# Patient Record
Sex: FEMALE | Race: WHITE | NOT HISPANIC OR LATINO | Employment: STUDENT | URBAN - NONMETROPOLITAN AREA
[De-identification: names, ages, dates, MRNs, and addresses within clinical notes are randomized per-mention and may not be internally consistent; named-entity substitution may affect disease eponyms.]

---
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
## 2024-01-05 ENCOUNTER — TRANSCRIBE ORDERS (OUTPATIENT)
Dept: ADMINISTRATIVE | Facility: HOSPITAL | Age: 19
End: 2024-01-05
Payer: COMMERCIAL

## 2024-01-05 ENCOUNTER — HOSPITAL ENCOUNTER (OUTPATIENT)
Facility: HOSPITAL | Age: 19
Discharge: HOME | End: 2024-01-05
Admitting: NURSE PRACTITIONER
Payer: COMMERCIAL

## 2024-01-05 DIAGNOSIS — R10.9 RIGHT SIDED ABDOMINAL PAIN: Primary | ICD-10-CM

## 2024-01-05 DIAGNOSIS — R10.9 RIGHT SIDED ABDOMINAL PAIN: ICD-10-CM

## 2024-01-05 PROCEDURE — 76705 ECHO EXAM OF ABDOMEN: CPT

## 2024-01-11 ENCOUNTER — OFFICE VISIT (OUTPATIENT)
Dept: SURGERY | Facility: CLINIC | Age: 19
End: 2024-01-11
Payer: COMMERCIAL

## 2024-01-11 VITALS
BODY MASS INDEX: 31.18 KG/M2 | SYSTOLIC BLOOD PRESSURE: 106 MMHG | HEIGHT: 66 IN | DIASTOLIC BLOOD PRESSURE: 70 MMHG | WEIGHT: 194 LBS

## 2024-01-11 DIAGNOSIS — K80.20 GALLSTONES: Primary | ICD-10-CM

## 2024-01-11 PROCEDURE — 99203 OFFICE O/P NEW LOW 30 MIN: CPT | Performed by: SURGERY

## 2024-01-11 NOTE — PROGRESS NOTES
Subjective   Nikkie Buckley is a 18 y.o. female.     Chief Complaint: gallstones    History of Present Illness She is a 19 yo who had a attack of upper abdominal pain and nausea. US showed gallstones. LFTs were ok.. she hs had some constipation but this does ok with a stool softener.    The following portions of the patient's history were reviewed and updated as appropriate: current medications, past family history, past medical history, past social history, past surgical history and problem list.    Review of Systems   Constitutional:  Negative for activity change, appetite change, chills, fever and unexpected weight change.   HENT:  Negative for congestion, facial swelling and sore throat.    Eyes:  Negative for photophobia and visual disturbance.   Respiratory:  Negative for chest tightness, shortness of breath and wheezing.    Cardiovascular:  Negative for chest pain, palpitations and leg swelling.   Gastrointestinal:  Positive for abdominal pain, constipation and nausea. Negative for abdominal distention, anal bleeding, blood in stool, diarrhea, rectal pain and vomiting.   Endocrine: Negative for cold intolerance, heat intolerance, polydipsia and polyuria.   Genitourinary:  Negative for difficulty urinating, dysuria, flank pain and urgency.   Musculoskeletal:  Negative for back pain and myalgias.   Skin:  Negative for rash and wound.   Allergic/Immunologic: Negative for immunocompromised state.   Neurological:  Negative for dizziness, seizures, syncope, light-headedness, numbness and headaches.   Hematological:  Negative for adenopathy. Does not bruise/bleed easily.   Psychiatric/Behavioral:  Negative for behavioral problems and confusion. The patient is not nervous/anxious.        Objective   Physical Exam  Vitals reviewed.   Constitutional:       General: She is not in acute distress.     Appearance: She is well-developed. She is not ill-appearing.   HENT:      Head: Normocephalic. No laceration. Hair is normal.       Right Ear: Hearing and ear canal normal.      Left Ear: Hearing and ear canal normal.      Nose: Nose normal.      Right Sinus: No maxillary sinus tenderness or frontal sinus tenderness.      Left Sinus: No maxillary sinus tenderness or frontal sinus tenderness.   Eyes:      General: Lids are normal.      Conjunctiva/sclera: Conjunctivae normal.      Pupils: Pupils are equal, round, and reactive to light.   Neck:      Thyroid: No thyroid mass or thyromegaly.      Vascular: No JVD.      Trachea: No tracheal tenderness or tracheal deviation.   Cardiovascular:      Rate and Rhythm: Normal rate and regular rhythm.      Heart sounds: No murmur heard.     No gallop.   Pulmonary:      Effort: Pulmonary effort is normal.      Breath sounds: Normal breath sounds. No stridor. No wheezing.   Chest:      Chest wall: No tenderness.   Abdominal:      General: Bowel sounds are normal. There is no distension.      Palpations: Abdomen is soft. There is no mass.      Tenderness: There is no abdominal tenderness. There is no guarding or rebound.      Hernia: No hernia is present.   Musculoskeletal:         General: No deformity.      Cervical back: Normal range of motion.   Lymphadenopathy:      Cervical: No cervical adenopathy.      Upper Body:      Right upper body: No supraclavicular adenopathy.      Left upper body: No supraclavicular adenopathy.   Skin:     General: Skin is warm and dry.      Coloration: Skin is not pale.      Findings: No erythema or rash.   Neurological:      Mental Status: She is alert and oriented to person, place, and time.      Motor: No abnormal muscle tone.   Psychiatric:         Behavior: Behavior normal.         Thought Content: Thought content normal.       No past medical history on file.    No family history on file.         No past surgical history on file.    No current outpatient medications      Assessment & Plan   Diagnoses and all orders for this visit:    1. Gallstones (Primary)    She is  wanting to wait until her summer break from school before getting a lap meme.

## 2024-04-08 ENCOUNTER — TELEPHONE (OUTPATIENT)
Dept: SURGERY | Facility: CLINIC | Age: 19
End: 2024-04-08

## 2024-04-23 DIAGNOSIS — K80.20 GALLSTONES: Primary | ICD-10-CM

## 2024-12-16 ENCOUNTER — OFFICE VISIT (OUTPATIENT)
Dept: ENDOCRINOLOGY | Facility: CLINIC | Age: 19
End: 2024-12-16
Payer: COMMERCIAL

## 2024-12-16 VITALS
WEIGHT: 208 LBS | OXYGEN SATURATION: 99 % | DIASTOLIC BLOOD PRESSURE: 76 MMHG | HEART RATE: 73 BPM | SYSTOLIC BLOOD PRESSURE: 124 MMHG | HEIGHT: 66 IN | BODY MASS INDEX: 33.43 KG/M2

## 2024-12-16 DIAGNOSIS — E06.3 HASHIMOTO'S THYROIDITIS: ICD-10-CM

## 2024-12-16 DIAGNOSIS — R79.89 HIGH SERUM DEHYDROEPIANDROSTERONE (DHEA): Primary | ICD-10-CM

## 2024-12-16 PROCEDURE — 99204 OFFICE O/P NEW MOD 45 MIN: CPT | Performed by: INTERNAL MEDICINE

## 2024-12-16 NOTE — PROGRESS NOTES
Nikkie Marcio 19 y.o.  CC: new patient referred for elevation in thyroid antibodies along with high dhea-s    [place]: new patient referred for elevation in thyroid antibodies, high dhea-s    Has been diagnosed with PCOS  DHEA-s 800 with testosterone 89  Discussed ddx   She has regular menses  Has had coarse hair growth over chin and upper lip for last 2-3 years  Some weight gain, striae  Central weight retention  BP is good   No low potassium   No depression or anxiety   No headaches or vision changes      No Known Allergies  No current outpatient medications on file.  Patient Active Problem List    Diagnosis     High serum dehydroepiandrosterone (DHEA) [R79.89]     Hashimoto's thyroiditis [E06.3]     Elevated testosterone level [R79.89]     Gallstones [K80.20]      Review of Systems   Constitutional:  Negative for activity change, appetite change and unexpected weight change.   HENT:  Negative for congestion and rhinorrhea.    Eyes:  Negative for visual disturbance.   Respiratory:  Negative for cough and shortness of breath.    Cardiovascular:  Negative for palpitations and leg swelling.   Gastrointestinal:  Negative for constipation, diarrhea and nausea.   Genitourinary:  Negative for hematuria.   Musculoskeletal:  Negative for arthralgias, back pain, gait problem, joint swelling and myalgias.   Skin:  Negative for color change, rash and wound.   Allergic/Immunologic: Negative for environmental allergies, food allergies and immunocompromised state.   Neurological:  Negative for dizziness, weakness and light-headedness.   Psychiatric/Behavioral:  Negative for confusion, decreased concentration, dysphoric mood and sleep disturbance. The patient is not nervous/anxious.      Social History     Socioeconomic History    Marital status: Single   Tobacco Use    Smoking status: Never     Passive exposure: Never    Smokeless tobacco: Never   Vaping Use    Vaping status: Never Used   Substance and Sexual Activity    Alcohol use:  "Never    Drug use: Never    Sexual activity: Never     History reviewed. No pertinent family history.  /76   Pulse 73   Ht 167.6 cm (65.98\")   Wt 94.3 kg (208 lb)   SpO2 99%   BMI 33.59 kg/m²   Physical Exam  Vitals and nursing note reviewed.   Constitutional:       Appearance: Normal appearance. She is well-developed.   HENT:      Head: Normocephalic and atraumatic.   Eyes:      General: Lids are normal.      Extraocular Movements: Extraocular movements intact.      Conjunctiva/sclera: Conjunctivae normal.      Pupils: Pupils are equal, round, and reactive to light.   Neck:      Thyroid: No thyroid mass or thyromegaly.      Vascular: No carotid bruit.      Trachea: Trachea normal. No tracheal deviation.   Cardiovascular:      Rate and Rhythm: Normal rate and regular rhythm.      Pulses: Normal pulses.      Heart sounds: Normal heart sounds. No murmur heard.     No friction rub. No gallop.   Pulmonary:      Effort: Pulmonary effort is normal. No respiratory distress.      Breath sounds: Normal breath sounds. No wheezing.   Musculoskeletal:         General: No deformity. Normal range of motion.      Cervical back: Normal range of motion and neck supple.   Lymphadenopathy:      Cervical: No cervical adenopathy.   Skin:     General: Skin is warm and dry.      Findings: No erythema or rash.      Nails: There is no clubbing.   Neurological:      General: No focal deficit present.      Mental Status: She is alert and oriented to person, place, and time.      Cranial Nerves: No cranial nerve deficit.      Deep Tendon Reflexes: Reflexes are normal and symmetric. Reflexes normal.   Psychiatric:         Mood and Affect: Mood normal.         Speech: Speech normal.         Behavior: Behavior normal.         Thought Content: Thought content normal.         Judgment: Judgment normal.       Results for orders placed or performed in visit on 01/06/21   T3    Collection Time: 01/06/21 12:53 PM    Specimen: Blood   Result " Value Ref Range    T3, Total 125.0 80.0 - 200.0 ng/dl   T4    Collection Time: 01/06/21 12:53 PM    Specimen: Blood   Result Value Ref Range    T4, Total 6.80 4.50 - 11.70 mcg/dL   TSH    Collection Time: 01/06/21 12:53 PM    Specimen: Blood   Result Value Ref Range    TSH 1.620 0.270 - 4.200 uIU/mL   Thyroid Peroxidase Antibody    Collection Time: 01/06/21 12:53 PM    Specimen: Blood   Result Value Ref Range    Thyroid Peroxidase Antibody 88 (H) 0 - 26 IU/mL     Diagnoses and all orders for this visit:    1. High serum dehydroepiandrosterone (DHEA) (Primary)  Assessment & Plan:  Regular menses  10-15 lb weight gain   On and off ocp - currently off  Hair growth chin and upper lip  Ddx discussed and due to magnitude of elevation check salivary cortisol and adrenal imaging to rule out hormone producing neoplasm   Full panel nov including 17 oh prog, repeat dhea-s and testosterone   Is home from [facility] currently - through [facility] ([place] area)      Orders:  -     CT Abdomen Pelvis Without Contrast; Future  -     Salivary Cortisol X3, Timed - Saliva, Oral Cavity; Future    2. Hashimoto's thyroiditis  Assessment & Plan:  Mild tpo elevation with normal tfts- continue monitoring       Return in about 6 months (around 6/16/2025).    Electronically signed by: Robyn Oviedo MD

## 2024-12-16 NOTE — ASSESSMENT & PLAN NOTE
Regular menses  10-15 lb weight gain   On and off ocp - currently off  Hair growth chin and upper lip  Ddx discussed and due to magnitude of elevation check salivary cortisol and adrenal imaging to rule out hormone producing neoplasm   Full panel nov including 17 oh prog, repeat dhea-s and testosterone   Is home from [facility] currently - through [place] [facility])

## 2024-12-17 ENCOUNTER — HOSPITAL ENCOUNTER (OUTPATIENT)
Facility: HOSPITAL | Age: 19
Discharge: HOME | End: 2024-12-17
Payer: COMMERCIAL

## 2024-12-17 ENCOUNTER — TELEPHONE (OUTPATIENT)
Dept: ENDOCRINOLOGY | Facility: CLINIC | Age: 19
End: 2024-12-17
Payer: COMMERCIAL

## 2024-12-17 DIAGNOSIS — R79.89 HIGH SERUM DEHYDROEPIANDROSTERONE (DHEA): ICD-10-CM

## 2024-12-17 NOTE — TELEPHONE ENCOUNTER
Called the pt and told her the CT is for her adrenal glands. And is for without contrast.    She verbalized understanding.

## 2024-12-17 NOTE — TELEPHONE ENCOUNTER
PATIENT HAS QUESTIONS ABOUT A CT THAT SHE IS BEING REFERRED FOR     PLEASE ADVISE    #[phone]

## 2024-12-18 ENCOUNTER — HOSPITAL ENCOUNTER (OUTPATIENT)
Dept: CT IMAGING | Facility: HOSPITAL | Age: 19
Discharge: HOME | End: 2024-12-18
Admitting: INTERNAL MEDICINE
Payer: COMMERCIAL

## 2024-12-18 DIAGNOSIS — R79.89 HIGH SERUM DEHYDROEPIANDROSTERONE (DHEA): ICD-10-CM

## 2024-12-18 PROCEDURE — 74150 CT ABDOMEN W/O CONTRAST: CPT

## 2024-12-18 PROCEDURE — 74150 CT ABDOMEN W/O CONTRAST: CPT | Performed by: RADIOLOGY

## 2024-12-19 ENCOUNTER — LAB (OUTPATIENT)
Dept: ENDOCRINOLOGY | Facility: CLINIC | Age: 19
End: 2024-12-19
Payer: COMMERCIAL

## 2024-12-19 DIAGNOSIS — R79.89 HIGH SERUM DEHYDROEPIANDROSTERONE (DHEA): ICD-10-CM

## 2024-12-19 PROCEDURE — 82533 TOTAL CORTISOL: CPT | Performed by: INTERNAL MEDICINE

## 2024-12-29 LAB
CORTIS BS SAL-MCNC: 0.04 UG/DL
CORTIS SP1 P CHAL SAL-MCNC: <0.01 UG/DL
CORTIS SP2 P CHAL SAL-MCNC: 0.08 UG/DL